# Patient Record
Sex: MALE | Race: OTHER | NOT HISPANIC OR LATINO | URBAN - METROPOLITAN AREA
[De-identification: names, ages, dates, MRNs, and addresses within clinical notes are randomized per-mention and may not be internally consistent; named-entity substitution may affect disease eponyms.]

---

## 2022-03-19 ENCOUNTER — EMERGENCY (EMERGENCY)
Facility: HOSPITAL | Age: 62
LOS: 1 days | Discharge: HOME | End: 2022-03-19
Attending: EMERGENCY MEDICINE | Admitting: EMERGENCY MEDICINE
Payer: COMMERCIAL

## 2022-03-19 VITALS
DIASTOLIC BLOOD PRESSURE: 84 MMHG | SYSTOLIC BLOOD PRESSURE: 159 MMHG | TEMPERATURE: 98 F | RESPIRATION RATE: 20 BRPM | HEART RATE: 63 BPM | OXYGEN SATURATION: 98 %

## 2022-03-19 VITALS
HEART RATE: 70 BPM | SYSTOLIC BLOOD PRESSURE: 174 MMHG | DIASTOLIC BLOOD PRESSURE: 106 MMHG | TEMPERATURE: 99 F | HEIGHT: 70 IN | OXYGEN SATURATION: 99 % | WEIGHT: 185.63 LBS | RESPIRATION RATE: 16 BRPM

## 2022-03-19 DIAGNOSIS — Z86.39 PERSONAL HISTORY OF OTHER ENDOCRINE, NUTRITIONAL AND METABOLIC DISEASE: Chronic | ICD-10-CM

## 2022-03-19 LAB
ALBUMIN SERPL ELPH-MCNC: 3.8 G/DL — SIGNIFICANT CHANGE UP (ref 3.3–5)
ALP SERPL-CCNC: 61 U/L — SIGNIFICANT CHANGE UP (ref 30–120)
ALT FLD-CCNC: 25 U/L DA — SIGNIFICANT CHANGE UP (ref 10–60)
ANION GAP SERPL CALC-SCNC: 8 MMOL/L — SIGNIFICANT CHANGE UP (ref 5–17)
APTT BLD: 33.5 SEC — SIGNIFICANT CHANGE UP (ref 27.5–35.5)
AST SERPL-CCNC: 21 U/L — SIGNIFICANT CHANGE UP (ref 10–40)
BASOPHILS # BLD AUTO: 0.02 K/UL — SIGNIFICANT CHANGE UP (ref 0–0.2)
BASOPHILS NFR BLD AUTO: 0.3 % — SIGNIFICANT CHANGE UP (ref 0–2)
BILIRUB SERPL-MCNC: 1.8 MG/DL — HIGH (ref 0.2–1.2)
BUN SERPL-MCNC: 14 MG/DL — SIGNIFICANT CHANGE UP (ref 7–23)
CALCIUM SERPL-MCNC: 8.7 MG/DL — SIGNIFICANT CHANGE UP (ref 8.4–10.5)
CHLORIDE SERPL-SCNC: 103 MMOL/L — SIGNIFICANT CHANGE UP (ref 96–108)
CO2 SERPL-SCNC: 28 MMOL/L — SIGNIFICANT CHANGE UP (ref 22–31)
CREAT SERPL-MCNC: 0.86 MG/DL — SIGNIFICANT CHANGE UP (ref 0.5–1.3)
D DIMER BLD IA.RAPID-MCNC: <150 NG/ML DDU — SIGNIFICANT CHANGE UP
EGFR: 98 ML/MIN/1.73M2 — SIGNIFICANT CHANGE UP
EOSINOPHIL # BLD AUTO: 0.02 K/UL — SIGNIFICANT CHANGE UP (ref 0–0.5)
EOSINOPHIL NFR BLD AUTO: 0.3 % — SIGNIFICANT CHANGE UP (ref 0–6)
GLUCOSE SERPL-MCNC: 116 MG/DL — HIGH (ref 70–99)
HCT VFR BLD CALC: 44.6 % — SIGNIFICANT CHANGE UP (ref 39–50)
HGB BLD-MCNC: 15.4 G/DL — SIGNIFICANT CHANGE UP (ref 13–17)
IMM GRANULOCYTES NFR BLD AUTO: 0.3 % — SIGNIFICANT CHANGE UP (ref 0–1.5)
INR BLD: 1.11 RATIO — SIGNIFICANT CHANGE UP (ref 0.88–1.16)
LYMPHOCYTES # BLD AUTO: 1.18 K/UL — SIGNIFICANT CHANGE UP (ref 1–3.3)
LYMPHOCYTES # BLD AUTO: 16 % — SIGNIFICANT CHANGE UP (ref 13–44)
MCHC RBC-ENTMCNC: 30.9 PG — SIGNIFICANT CHANGE UP (ref 27–34)
MCHC RBC-ENTMCNC: 34.5 GM/DL — SIGNIFICANT CHANGE UP (ref 32–36)
MCV RBC AUTO: 89.6 FL — SIGNIFICANT CHANGE UP (ref 80–100)
MONOCYTES # BLD AUTO: 0.39 K/UL — SIGNIFICANT CHANGE UP (ref 0–0.9)
MONOCYTES NFR BLD AUTO: 5.3 % — SIGNIFICANT CHANGE UP (ref 2–14)
NEUTROPHILS # BLD AUTO: 5.76 K/UL — SIGNIFICANT CHANGE UP (ref 1.8–7.4)
NEUTROPHILS NFR BLD AUTO: 77.8 % — HIGH (ref 43–77)
NRBC # BLD: 0 /100 WBCS — SIGNIFICANT CHANGE UP (ref 0–0)
PLATELET # BLD AUTO: 252 K/UL — SIGNIFICANT CHANGE UP (ref 150–400)
POTASSIUM SERPL-MCNC: 3.9 MMOL/L — SIGNIFICANT CHANGE UP (ref 3.5–5.3)
POTASSIUM SERPL-SCNC: 3.9 MMOL/L — SIGNIFICANT CHANGE UP (ref 3.5–5.3)
PROT SERPL-MCNC: 7.3 G/DL — SIGNIFICANT CHANGE UP (ref 6–8.3)
PROTHROM AB SERPL-ACNC: 12.8 SEC — SIGNIFICANT CHANGE UP (ref 10.5–13.4)
RBC # BLD: 4.98 M/UL — SIGNIFICANT CHANGE UP (ref 4.2–5.8)
RBC # FLD: 12.9 % — SIGNIFICANT CHANGE UP (ref 10.3–14.5)
SARS-COV-2 RNA SPEC QL NAA+PROBE: SIGNIFICANT CHANGE UP
SODIUM SERPL-SCNC: 139 MMOL/L — SIGNIFICANT CHANGE UP (ref 135–145)
TROPONIN I, HIGH SENSITIVITY RESULT: 8.2 NG/L — SIGNIFICANT CHANGE UP
WBC # BLD: 7.39 K/UL — SIGNIFICANT CHANGE UP (ref 3.8–10.5)
WBC # FLD AUTO: 7.39 K/UL — SIGNIFICANT CHANGE UP (ref 3.8–10.5)

## 2022-03-19 PROCEDURE — 93005 ELECTROCARDIOGRAM TRACING: CPT

## 2022-03-19 PROCEDURE — 84484 ASSAY OF TROPONIN QUANT: CPT

## 2022-03-19 PROCEDURE — 36415 COLL VENOUS BLD VENIPUNCTURE: CPT

## 2022-03-19 PROCEDURE — 85025 COMPLETE CBC W/AUTO DIFF WBC: CPT

## 2022-03-19 PROCEDURE — 85610 PROTHROMBIN TIME: CPT

## 2022-03-19 PROCEDURE — 71045 X-RAY EXAM CHEST 1 VIEW: CPT | Mod: 26

## 2022-03-19 PROCEDURE — 85379 FIBRIN DEGRADATION QUANT: CPT

## 2022-03-19 PROCEDURE — 99285 EMERGENCY DEPT VISIT HI MDM: CPT | Mod: 25

## 2022-03-19 PROCEDURE — 85730 THROMBOPLASTIN TIME PARTIAL: CPT

## 2022-03-19 PROCEDURE — 71045 X-RAY EXAM CHEST 1 VIEW: CPT

## 2022-03-19 PROCEDURE — 99285 EMERGENCY DEPT VISIT HI MDM: CPT

## 2022-03-19 PROCEDURE — 93970 EXTREMITY STUDY: CPT | Mod: 26

## 2022-03-19 PROCEDURE — 87635 SARS-COV-2 COVID-19 AMP PRB: CPT

## 2022-03-19 PROCEDURE — 80053 COMPREHEN METABOLIC PANEL: CPT

## 2022-03-19 PROCEDURE — 93010 ELECTROCARDIOGRAM REPORT: CPT

## 2022-03-19 PROCEDURE — 93970 EXTREMITY STUDY: CPT

## 2022-03-19 RX ORDER — DULOXETINE HYDROCHLORIDE 30 MG/1
1 CAPSULE, DELAYED RELEASE ORAL
Qty: 0 | Refills: 0 | DISCHARGE

## 2022-03-19 RX ORDER — NITROGLYCERIN 6.5 MG
0.4 CAPSULE, EXTENDED RELEASE ORAL ONCE
Refills: 0 | Status: COMPLETED | OUTPATIENT
Start: 2022-03-19 | End: 2022-03-19

## 2022-03-19 RX ORDER — LEVOTHYROXINE SODIUM 125 MCG
1 TABLET ORAL
Qty: 0 | Refills: 0 | DISCHARGE

## 2022-03-19 RX ORDER — ASPIRIN/CALCIUM CARB/MAGNESIUM 324 MG
324 TABLET ORAL ONCE
Refills: 0 | Status: COMPLETED | OUTPATIENT
Start: 2022-03-19 | End: 2022-03-19

## 2022-03-19 RX ADMIN — Medication 0.4 MILLIGRAM(S): at 13:13

## 2022-03-19 RX ADMIN — Medication 324 MILLIGRAM(S): at 13:13

## 2022-03-19 NOTE — CONSULT NOTE ADULT - ASSESSMENT
The patient is a 61 year old male with a history of hypothyroidism who presents with chest pain.    Plan:  - Chest pain is atypical and pleuritic in nature, possibly costochondritis  - ECG with nonspecific findings  - Given duration of symptoms, an acute MI is ruled out with one set of cardiac enzymes  - D-dimer negative making PE unlikely  - CXR negative chest  - The patient can be discharged and follow-up as outpatient for stress testing

## 2022-03-19 NOTE — CONSULT NOTE ADULT - SUBJECTIVE AND OBJECTIVE BOX
History of Present Illness: The patient is a 61 year old male with a history of hypothyroidism who presents with chest pain. He states early in the morning he had the development of substernal and bilateral chest tightness. It was not radiating or positional, but it was worse with inspiration. There was associated shortness of breath and intermittent palpitations. No nausea or dizziness.    Past Medical/Surgical History:  Hypothyroidism    Medications:  Home Medications:  Cymbalta 60 mg oral delayed release capsule: 1 cap(s) orally once a day (19 Mar 2022 13:38)  Synthroid 100 mcg (0.1 mg) oral tablet: 1 tab(s) orally once a day (19 Mar 2022 13:38)      Family History: Non-contributory family history of premature cardiovascular atherosclerotic disease    Social History: No tobacco, alcohol or drug use    Review of Systems:  General: No fevers, chills, weight gain  Skin: No rashes, color changes  Cardiovascular: +chest pain, orthopnea  Respiratory: No shortness of breath, cough  Gastrointestinal: No nausea, abdominal pain  Genitourinary: No incontinence, pain with urination  Musculoskeletal: No pain, swelling, decreased range of motion  Neurological: No headache, weakness  Psychiatric: No depression, anxiety  Endocrine: No weight gain, increased thirst  All other systems are comprehensively negative.    Physical Exam:  Vitals:        Vital Signs Last 24 Hrs  T(C): 37.1 (19 Mar 2022 12:50), Max: 37.1 (19 Mar 2022 12:50)  T(F): 98.8 (19 Mar 2022 12:50), Max: 98.8 (19 Mar 2022 12:50)  HR: 62 (19 Mar 2022 14:10) (62 - 70)  BP: 159/77 (19 Mar 2022 14:10) (159/77 - 174/106)  BP(mean): --  RR: 13 (19 Mar 2022 14:10) (13 - 20)  SpO2: 100% (19 Mar 2022 14:10) (99% - 100%)  General: NAD  HEENT: MMM  Neck: No JVD, no carotid bruit  Lungs: CTAB  CV: RRR, nl S1/S2, no M/R/G  Abdomen: S/NT/ND, +BS  Extremities: No LE edema, no cyanosis  Neuro: AAOx3, non-focal  Skin: No rash    Labs:                        15.4   7.39  )-----------( 252      ( 19 Mar 2022 13:30 )             44.6     03-19    139  |  103  |  14  ----------------------------<  116<H>  3.9   |  28  |  0.86    Ca    8.7      19 Mar 2022 13:30    TPro  7.3  /  Alb  3.8  /  TBili  1.8<H>  /  DBili  x   /  AST  21  /  ALT  25  /  AlkPhos  61  03-19        PT/INR - ( 19 Mar 2022 13:30 )   PT: 12.8 sec;   INR: 1.11 ratio         PTT - ( 19 Mar 2022 13:30 )  PTT:33.5 sec    ECG: NSR, normal axis, incomplete RBBB, nonspecific anterior TWI

## 2022-03-19 NOTE — ED PROVIDER NOTE - MUSCULOSKELETAL, MLM
Spine appears normal, range of motion is not limited, no muscle or joint tenderness <<-----Click here for Discharge Medication Review

## 2022-03-19 NOTE — ED PROVIDER NOTE - PATIENT PORTAL LINK FT
You can access the FollowMyHealth Patient Portal offered by [facility] by registering at the following website: [email or web address]. By joining [facility]’s FollowMyHealth portal, you will also be able to view your health information using other applications (apps) compatible with our system.

## 2022-03-19 NOTE — ED PROVIDER NOTE - OBJECTIVE STATEMENT
pt c/o substernal chest pressure since this am, pleuritic in nature. pain associated with mild dizziness and sob. pt reports chronic calf pain b/l. no fevers, chills, ha, n/v, abd pain, edema, travel. pt denies personal cardiac history, but his father had MI at pt's current age, so came to er.  pmd- tsering lee - none

## 2022-03-19 NOTE — ED PROVIDER NOTE - PROGRESS NOTE DETAILS
stevie (jesus) seen eval pt, cleared for d/c and outpt f/u. Reevaluated patient at bedside.  Patient feeling much improved.  Discussed the results of all diagnostic testing in ED and copies of all reports given.   An opportunity to ask questions was given.  Discussed the importance of prompt, close medical follow-up.  Patient will return with any changes, concerns or persistent / worsening symptoms.  Understanding of all instructions verbalized.

## 2022-03-19 NOTE — ED ADULT NURSE NOTE - OBJECTIVE STATEMENT
Patient presents with complaint of intermittent midsternal chest tightness/heaviness since this morning. Patient does not recall his activity at the time he first noted the pain but reports the pain increases with deep inspiration. The pain does not change with movement, denies shortness of breath but feels he can't take in a full breath. Patient denies any fever or cough.

## 2022-03-19 NOTE — ED PROVIDER NOTE - CARE PROVIDER_API CALL
Nawaf Kumar (MD)  Cardiovascular Disease; Internal Medicine  [address], [address]  [place] [address]  Phone: [phone]  Fax: [phone]  Follow Up Time: 1-3 Days

## 2023-08-31 ENCOUNTER — OFFICE (OUTPATIENT)
Dept: URBAN - METROPOLITAN AREA CLINIC 109 | Facility: CLINIC | Age: 63
Setting detail: OPHTHALMOLOGY
End: 2023-08-31
Payer: COMMERCIAL

## 2023-08-31 DIAGNOSIS — H52.7: ICD-10-CM

## 2023-08-31 DIAGNOSIS — H43.393: ICD-10-CM

## 2023-08-31 PROCEDURE — 92004 COMPRE OPH EXAM NEW PT 1/>: CPT | Performed by: OPHTHALMOLOGY

## 2023-08-31 PROCEDURE — 92015 DETERMINE REFRACTIVE STATE: CPT | Performed by: OPHTHALMOLOGY

## 2023-08-31 ASSESSMENT — TONOMETRY
OS_IOP_MMHG: 17
OD_IOP_MMHG: 16

## 2023-08-31 ASSESSMENT — REFRACTION_CURRENTRX
OD_OVR_VA: 20/
OS_OVR_VA: 20/
OS_CYLINDER: SPHERE
OS_VPRISM_DIRECTION: SV
OD_SPHERE: +2.25
OD_CYLINDER: SPHERE
OD_VPRISM_DIRECTION: SV
OS_SPHERE: +2.25

## 2023-08-31 ASSESSMENT — REFRACTION_AUTOREFRACTION
OD_CYLINDER: -1.00
OD_SPHERE: +2.25
OS_CYLINDER: -1.25
OS_AXIS: 090
OD_AXIS: 120
OS_SPHERE: +2.50

## 2023-08-31 ASSESSMENT — CONFRONTATIONAL VISUAL FIELD TEST (CVF)
OS_FINDINGS: FULL
OD_FINDINGS: FULL

## 2023-08-31 ASSESSMENT — SPHEQUIV_DERIVED
OS_SPHEQUIV: 1.875
OD_SPHEQUIV: 1.75
OS_SPHEQUIV: 1.875
OD_SPHEQUIV: 1.75

## 2023-08-31 ASSESSMENT — VISUAL ACUITY
OS_BCVA: 20/60+2
OD_BCVA: 20/25-2

## 2023-08-31 ASSESSMENT — REFRACTION_MANIFEST
OD_AXIS: 120
OD_VA1: 20/20
OS_SPHERE: +2.50
OD_VA1: 20/20
OD_SPHERE: +2.25
OS_AXIS: 90
OD_CYLINDER: -1.00
OS_CYLINDER: -1.25
OS_VA1: 20/20
OS_VA1: 20/20
OD_SPHERE: +1.75
OS_SPHERE: +2.00

## 2023-09-28 ENCOUNTER — OFFICE (OUTPATIENT)
Dept: URBAN - METROPOLITAN AREA CLINIC 109 | Facility: CLINIC | Age: 63
Setting detail: OPHTHALMOLOGY
End: 2023-09-28
Payer: COMMERCIAL

## 2023-09-28 DIAGNOSIS — H52.7: ICD-10-CM

## 2023-09-28 PROCEDURE — GLASS CHEC GLASS RECHECK/ NO CHARGE: Performed by: OPHTHALMOLOGY

## 2023-09-28 ASSESSMENT — REFRACTION_MANIFEST
OD_SPHERE: +2.25
OS_VA1: 20/20
OD_SPHERE: +1.50
OS_AXIS: 80
OS_SPHERE: +2.50
OS_VA1: 20/20
OD_AXIS: 120
OS_CYLINDER: -1.25
OD_VA1: 20/20
OD_CYLINDER: -0.25
OS_AXIS: 80
OS_CYLINDER: -1.25
OS_CYLINDER: -1.75
OS_AXIS: 90
OD_CYLINDER: -1.00
OS_SPHERE: +2.50
OD_AXIS: 120
OS_SPHERE: +2.25
OD_AXIS: 120
OD_VA1: 20/20
OD_CYLINDER: -0.25
OD_SPHERE: +1.50

## 2023-09-28 ASSESSMENT — AXIALLENGTH_DERIVED
OS_AL: 23.459
OS_AL: 23.5557
OD_AL: 23.3713
OS_AL: 23.5557
OD_AL: 23.5156
OS_AL: 23.459

## 2023-09-28 ASSESSMENT — REFRACTION_CURRENTRX
OD_VPRISM_DIRECTION: SV
OD_SPHERE: +1.75
OD_CYLINDER: SPHERE
OD_OVR_VA: 20/
OD_SPHERE: +1.75
OS_OVR_VA: 20/
OS_CYLINDER: 0.00
OD_OVR_VA: 20/
OS_SPHERE: +2.00
OS_OVR_VA: 20/
OS_SPHERE: +2.00
OS_VPRISM_DIRECTION: SV

## 2023-09-28 ASSESSMENT — VISUAL ACUITY
OD_BCVA: 20/20
OS_BCVA: 20/20

## 2023-09-28 ASSESSMENT — KERATOMETRY
OS_K2POWER_DIOPTERS: 42.50
OD_AXISANGLE_DEGREES: 065
OD_K2POWER_DIOPTERS: 42.50
OS_AXISANGLE_DEGREES: 175
OS_K1POWER_DIOPTERS: 41.25
OD_K1POWER_DIOPTERS: 42.00

## 2023-09-28 ASSESSMENT — REFRACTION_AUTOREFRACTION
OD_CYLINDER: -0.75
OD_AXIS: 123
OS_CYLINDER: -1.75
OD_SPHERE: +1.75
OS_AXIS: 081
OS_SPHERE: +2.75

## 2023-09-28 ASSESSMENT — TONOMETRY
OS_IOP_MMHG: 15
OD_IOP_MMHG: 15

## 2023-09-28 ASSESSMENT — SPHEQUIV_DERIVED
OS_SPHEQUIV: 1.625
OD_SPHEQUIV: 1.375
OS_SPHEQUIV: 1.875
OD_SPHEQUIV: 1.375
OS_SPHEQUIV: 1.875
OD_SPHEQUIV: 1.75
OD_SPHEQUIV: 1.375
OS_SPHEQUIV: 1.625

## 2024-12-12 ENCOUNTER — OFFICE (OUTPATIENT)
Dept: URBAN - METROPOLITAN AREA CLINIC 109 | Facility: CLINIC | Age: 64
Setting detail: OPHTHALMOLOGY
End: 2024-12-12
Payer: COMMERCIAL

## 2024-12-12 DIAGNOSIS — H52.7: ICD-10-CM

## 2024-12-12 DIAGNOSIS — H02.832: ICD-10-CM

## 2024-12-12 DIAGNOSIS — H02.835: ICD-10-CM

## 2024-12-12 DIAGNOSIS — H02.831: ICD-10-CM

## 2024-12-12 DIAGNOSIS — H43.393: ICD-10-CM

## 2024-12-12 DIAGNOSIS — H02.834: ICD-10-CM

## 2024-12-12 PROCEDURE — 92014 COMPRE OPH EXAM EST PT 1/>: CPT | Performed by: OPHTHALMOLOGY

## 2024-12-12 ASSESSMENT — TONOMETRY
OD_IOP_MMHG: 17
OS_IOP_MMHG: 19
OS_IOP_MMHG: 19
OD_IOP_MMHG: 17

## 2024-12-12 ASSESSMENT — REFRACTION_MANIFEST
OD_CYLINDER: -0.25
OS_AXIS: 80
OD_AXIS: 120
OS_AXIS: 90
OS_SPHERE: +2.50
OD_CYLINDER: -0.25
OS_SPHERE: +2.50
OD_SPHERE: +1.50
OD_SPHERE: +2.25
OS_CYLINDER: -1.25
OD_SPHERE: +1.50
OD_VA1: 20/20
OD_CYLINDER: -1.00
OS_AXIS: 80
OD_AXIS: 120
OS_VA1: 20/20
OS_CYLINDER: -1.25
OS_CYLINDER: -1.75
OD_VA1: 20/20
OS_VA1: 20/20
OD_AXIS: 120
OS_SPHERE: +2.25
OS_VA1: 20/20
OD_VA1: 20/20

## 2024-12-12 ASSESSMENT — REFRACTION_CURRENTRX
OD_VPRISM_DIRECTION: SV
OS_SPHERE: +2.50
OS_OVR_VA: 20/
OD_AXIS: 125
OS_CYLINDER: +1.25
OD_CYLINDER: -0.25
OD_CYLINDER: SPHERE
OS_VPRISM_DIRECTION: SV
OD_SPHERE: +1.50
OD_SPHERE: +2.50
OS_SPHERE: +2.25
OS_AXIS: 75
OS_OVR_VA: 20/
OS_CYLINDER: 0.00
OD_OVR_VA: 20/
OD_OVR_VA: 20/

## 2024-12-12 ASSESSMENT — REFRACTION_AUTOREFRACTION
OS_AXIS: 077
OD_CYLINDER: -0.75
OD_AXIS: 129
OD_SPHERE: +1.50
OS_CYLINDER: -1.75
OS_SPHERE: +2.75

## 2024-12-12 ASSESSMENT — CONFRONTATIONAL VISUAL FIELD TEST (CVF)
OD_FINDINGS: FULL
OS_FINDINGS: FULL

## 2024-12-12 ASSESSMENT — KERATOMETRY
OS_K2POWER_DIOPTERS: 42.50
OD_K1POWER_DIOPTERS: 42.00
OS_K1POWER_DIOPTERS: 41.25
OD_K2POWER_DIOPTERS: 42.50
OD_AXISANGLE_DEGREES: 065
OS_AXISANGLE_DEGREES: 175

## 2024-12-12 ASSESSMENT — VISUAL ACUITY
OD_BCVA: 20/20
OS_BCVA: 20/20